# Patient Record
Sex: MALE | Race: WHITE | NOT HISPANIC OR LATINO | Employment: OTHER | URBAN - METROPOLITAN AREA
[De-identification: names, ages, dates, MRNs, and addresses within clinical notes are randomized per-mention and may not be internally consistent; named-entity substitution may affect disease eponyms.]

---

## 2023-12-13 ENCOUNTER — CLINICAL SUPPORT (OUTPATIENT)
Dept: AUDIOLOGY | Facility: HOSPITAL | Age: 83
End: 2023-12-13
Payer: MEDICARE

## 2023-12-13 DIAGNOSIS — H90.3 SENSORINEURAL HEARING LOSS (SNHL) OF BOTH EARS: Primary | ICD-10-CM

## 2023-12-13 PROCEDURE — V5299 HEARING SERVICE: HCPCS | Performed by: AUDIOLOGIST

## 2023-12-13 ASSESSMENT — ENCOUNTER SYMPTOMS
OCCASIONAL FEELINGS OF UNSTEADINESS: 0
DEPRESSION: 0
LOSS OF SENSATION IN FEET: 0

## 2023-12-13 ASSESSMENT — PAIN - FUNCTIONAL ASSESSMENT: PAIN_FUNCTIONAL_ASSESSMENT: 0-10

## 2023-12-13 ASSESSMENT — PAIN SCALES - GENERAL: PAINLEVEL_OUTOF10: 0 - NO PAIN

## 2023-12-13 NOTE — PROGRESS NOTES
AUDIOLOGY HEARING AID CHECK      Name:  Clifton Jane  :  1940  Age:  83 y.o.  Date of Appointment:  2023     History:  Mr. Jane is seen today for Hearing Aid Check (Reports that the right hearing aid does not seem to be working for him.  He has had arm surgery and struggles to clean the hearing aids and replace domes / filters himself.  )     Current hearing aids:  Dispensing date: 2023  Model: Phonak Audeo P50R  Right serial number: [id number] Left serial number: [id number]  Aids coupled via: -in-the-canal    size #1M Dome size medium power  Service, loss, and damage warranty expires on: 2026  Last hearing evaluation : 6-     Upon initial examination, the hearing aids sounded good.      Cleaned and checked both devices.    Replaced filters bilaterally.  Replaced domes bilaterally.     Both hearing aids sound and look good on final examination.      Otoscopic observation revealed mild cerumen in the ear canals bilaterally.    IMPRESSIONS:  Both hearing aids function appropriately following today's routine maintenance procedures.  The patient also reports the aids sound good.      RECOMMENDATIONS:  -Daily use of hearing aids.  -Daily maintenance to be performed at home.   -Follow-up hearing aid check in 3 months or as needed, already scheduled for 2024 at 10am    PATIENT EDUCATION:   Discussed above information with Mr. Jane.  Questions were addressed and the patient was encouraged to contact our department should concerns arise.    Time:  14:00 to 14:12    VICTOR M Starr, [facility]  Licensed Audiologist

## 2024-03-07 ENCOUNTER — CLINICAL SUPPORT (OUTPATIENT)
Dept: AUDIOLOGY | Facility: HOSPITAL | Age: 84
End: 2024-03-07
Payer: MEDICARE

## 2024-03-07 DIAGNOSIS — H90.3 SENSORINEURAL HEARING LOSS (SNHL) OF BOTH EARS: Primary | ICD-10-CM

## 2024-03-07 PROCEDURE — V5299 HEARING SERVICE: HCPCS | Performed by: AUDIOLOGIST

## 2024-03-07 ASSESSMENT — PAIN SCALES - GENERAL: PAINLEVEL_OUTOF10: 0 - NO PAIN

## 2024-03-07 ASSESSMENT — PAIN - FUNCTIONAL ASSESSMENT: PAIN_FUNCTIONAL_ASSESSMENT: 0-10

## 2024-03-07 NOTE — PROGRESS NOTES
AUDIOLOGY HEARING AID CHECK      Name:  Clifton Jane  :  1940  Age:  83 y.o.  Date of Appointment:  3/7/2024     History:  Mr. Jane is seen today for Hearing Aid Check (Routine hearing aid maintenance.  )   He has noticed a feedback when he raises the television volume high, even when he turns down the volume on the hearing aids.  He also struggles in noisy environments like a restaurant.    Current hearing aids:  Dispensing date: 2023  Model: Phonak Audeo P50R  Right serial number: [id number] Left serial number: [id number]  Aids coupled via: -in-the-[place]al    size #1M Dome size medium power  Service, loss, and damage warranty expires on: 2026  Last hearing evaluation : 6-     Upon initial examination, the hearing aids sounded good.      Cleaned and checked both devices.    Replaced filters bilaterally.  Gave extra supplies to patient.   Replaced domes bilaterally.     Both hearing aids sound and look good on final examination.  No feedback was noted in the office.      Otoscopic observation revealed mostly clear ear canals bilaterally.    Per data logging, this patient wears the hearing aids 15 hours / day on average, 82% in calm environments, 11% speech in noise, 7% comfort in noise.  Applied volume adaptations bilaterally, as he has been raising volume significantly since the last visit.    IMPRESSIONS:  Both hearing aids function appropriately following today's routine maintenance procedures.  The patient also reports the aids sound good.      RECOMMENDATIONS:  -Daily use of hearing aids.  -Daily maintenance to be performed at home.   -Follow-up hearing aid check in 6-12 months or as needed.  Scheduled on 2024 at 10am  -Communication strategies to ease listening difficulties were discussed.  For example,   1. Speakers should be in the same room and facing the listener.  2. Speakers should obtain the listener's attention before speaking.    3. Speakers  should speak slowly and clearly.  4. Reduce background noises during conversations when possible.     PATIENT EDUCATION:   Discussed above information with Mr. Jane.  Questions were addressed and the patient was encouraged to contact our department should concerns arise.    Time:  09:54 to 10:23      VICTOR M Starr, CCC-A  Senior Audiologist-

## 2024-09-05 ENCOUNTER — CLINICAL SUPPORT (OUTPATIENT)
Dept: AUDIOLOGY | Facility: HOSPITAL | Age: 84
End: 2024-09-05

## 2024-09-05 DIAGNOSIS — H90.3 SENSORINEURAL HEARING LOSS (SNHL) OF BOTH EARS: Primary | ICD-10-CM

## 2024-09-05 PROCEDURE — V5299 HEARING SERVICE: HCPCS | Mod: AUDSP | Performed by: AUDIOLOGIST

## 2024-09-05 NOTE — PROGRESS NOTES
"AUDIOLOGY HEARING AID CHECK      Name:  Clifton Jane  :  1940  Age:  84 y.o.  Date of Appointment:  2024     History:  Mr. Jane is seen today for Hearing Aid Check (Concern about right hearing aid )  The retention  had come off of the right  when he was cleaning and changing the filters, and he could not replace it as it was before.  I demonstrated how to replace the  in office today.    When watching television, he generally turns down the volume to be more comfortable.    He is bothered by loud, higher pitched sounds (e.g., children \"screeching\") in public    Current hearing aids:  Dispensing date: 2023  Model: Phonak Audeo P50R  Right serial number: [id number] Left serial number: [id number]  Aids coupled via: -[place]-the-[place]    size #1M Dome size medium power  Service, loss, and damage warranty expires on: 2026  Last hearing evaluation: 6-     Upon initial examination, the hearing aids sounded good.      Cleaned and checked both devices.    Replaced filters bilaterally.  Gave extra supplies to patient.   Replaced domes bilaterally.   Gave extra supplies to patient.     Both hearing aids sound and look good on final examination.      To address reported discomfort with loud, high frequency sounds, I increased noise block in the comfort in noise setting from 12 to 15.  Decreased high frequency gain for loud sounds bilaterally.      Otoscopic observation revealed mostly clear ear canals bilaterally.    Per data logging, this patient wears the hearing aids 10.2 hours / day on average.      IMPRESSIONS:  Both hearing aids function appropriately following today's routine maintenance procedures.  The patient also reports the aids sound good.      RECOMMENDATIONS:  -Daily use of hearing aids.  -Daily maintenance to be performed at home.   -Follow-up hearing aid check in 6-12  months or as needed.  Appointment is scheduled on 2025 at 10am.     PATIENT " EDUCATION:   Discussed above information with Mr. Jane.  Questions were addressed and the patient was encouraged to contact our department should concerns arise.    Time:  09:55 to 10:17      VICTOR M Starr, CCC-A  Senior Audiologist

## 2025-01-02 ENCOUNTER — CLINICAL SUPPORT (OUTPATIENT)
Dept: AUDIOLOGY | Facility: HOSPITAL | Age: 85
End: 2025-01-02

## 2025-01-02 DIAGNOSIS — H90.3 SENSORINEURAL HEARING LOSS (SNHL) OF BOTH EARS: Primary | ICD-10-CM

## 2025-01-02 PROCEDURE — V5299 HEARING SERVICE: HCPCS | Mod: AUDSP | Performed by: AUDIOLOGIST

## 2025-01-02 ASSESSMENT — PAIN - FUNCTIONAL ASSESSMENT: PAIN_FUNCTIONAL_ASSESSMENT: 0-10

## 2025-01-02 ASSESSMENT — PAIN SCALES - GENERAL: PAINLEVEL_OUTOF10: 0 - NO PAIN

## 2025-01-02 NOTE — PROGRESS NOTES
"AUDIOLOGY HEARING AID CHECK      Name:  Clifton Jane  :  1940  Age:  84 y.o.  Date of Appointment:  2025     History:  Mr. Jane is seen today for Hearing Aid Check (Not hearing well with hearing aids recently.  )    Current hearing aids:  Dispensing date: 2023  Model: Phonak Audeo P50R  Right serial number: [id number] Left serial number: [id number]  Aids coupled via: -[place]-the-[place]    size #1M (5) Dome size medium power  Service, loss, and damage warranty expires on: 2026  Last hearing evaluation: 6-     Upon initial examination, the right hearing aid was only static with poor volume control function.  The left hearing aid was weak.  Cerumen / debris was observed on both hearing aids, particularly in the microphone openings of the right hearing aid.      Cleaned and checked both devices.    Replaced receivers to #1M (5) due to his receivers and filters being discontinued.    Changed to cerustop filters bilaterally.  Gave extra supplies to patient and demonstrated use.    Replaced domes bilaterally.   Cleaned microphone openings and volume control bilaterally.      Both hearing aids sound and look good on final examination.  The \"down\" part of the right volume control was intermittent, but was working better with repeated movement of it.      Otoscopic observation revealed clear right ear canal and some cerumen deep in the left ear canal.    IMPRESSIONS:  Both hearing aids function appropriately following today's routine maintenance procedures.  The patient also reports the aids sound good.   We could consider right hearing aid repair if the volume control does not function at some point, though he can still adjust volume on both from the left device.       RECOMMENDATIONS:  -Daily use of hearing aids.  -Daily maintenance to be performed at home.   -Follow-up hearing aid check in 6-12 months or as needed.  He will schedule when he returns from [place] in the " spring.    PATIENT EDUCATION:   Discussed above information with Mr. Jane.  Questions were addressed and the patient was encouraged to contact our department should concerns arise.    Time:  11:32 to 11:57      VICTOR M Starr, CCC-A  Senior Audiologist

## 2025-04-02 ENCOUNTER — APPOINTMENT (OUTPATIENT)
Dept: AUDIOLOGY | Facility: HOSPITAL | Age: 85
End: 2025-04-02
Payer: MEDICARE

## 2025-04-14 ENCOUNTER — CLINICAL SUPPORT (OUTPATIENT)
Dept: AUDIOLOGY | Facility: HOSPITAL | Age: 85
End: 2025-04-14

## 2025-04-14 DIAGNOSIS — H90.3 SENSORINEURAL HEARING LOSS (SNHL) OF BOTH EARS: Primary | ICD-10-CM

## 2025-04-14 PROCEDURE — V5299 HEARING SERVICE: HCPCS | Mod: AUDSP | Performed by: AUDIOLOGIST

## 2025-04-14 ASSESSMENT — PAIN SCALES - GENERAL: PAINLEVEL_OUTOF10: 0 - NO PAIN

## 2025-04-14 ASSESSMENT — PAIN - FUNCTIONAL ASSESSMENT: PAIN_FUNCTIONAL_ASSESSMENT: 0-10

## 2025-04-14 NOTE — PROGRESS NOTES
AUDIOLOGY HEARING AID CHECK      Name:  Clifton Jane  :  1940  Age:  85 y.o.  Date of Appointment:  2025     History:  Mr. Jane is seen today for Hearing Aid Check  About 3 weeks, gain / volume seems to be reducing.  The charge indicator lights seem to be working.        Current hearing aids:  Dispensing date: 2023  Model: Phonak Audeo P50R  Right serial number: [id number] Left serial number: [id number] (corrected 2025)  Aids coupled via: -[place]the[place]    size #1M (5) Dome size medium power  Service, loss, and damage warranty expires on: 2026  Last hearing evaluation: 6-     Upon initial examination, the hearing aid batteries were very low (red indicator lights).  After brief charging in office, both hearing aids sounded very weak, with cerumen noted on both receivers / domes.      Cleaned and checked both devices.    Replaced filters bilaterally.  Replaced domes bilaterally.     The right hearing aid still seemed very weak.  Changed the .      Both hearing aids sound and look good on final examination.      Otoscopic observation revealed mild, nonoccluding cerumen in the ear canals bilaterally.    Per data logging, this patient wears the hearing aids 13-14 hours / day on average.      IMPRESSIONS:  Both hearing aids function appropriately following today's routine maintenance procedures.  The patient also reports the aids sound good.      RECOMMENDATIONS:  -Daily use of hearing aids.  -Daily maintenance to be performed at home.   -Follow-up hearing aid check in 3 months or as needed.  Appointment is scheduled on 2025 at 10am.     PATIENT EDUCATION:   Discussed above information with Mr. Jane.  Questions were addressed and the patient was encouraged to contact our department should concerns arise.    Verified patient's identification verbally and by armband.    Time:  10:34 to 11:01      VICTOR M Starr, CCC-A  Senior Audiologist

## 2025-07-17 ENCOUNTER — APPOINTMENT (OUTPATIENT)
Dept: AUDIOLOGY | Facility: HOSPITAL | Age: 85
End: 2025-07-17
Payer: MEDICARE

## 2025-07-17 DIAGNOSIS — H90.3 SENSORINEURAL HEARING LOSS (SNHL) OF BOTH EARS: Primary | ICD-10-CM

## 2025-07-17 PROCEDURE — V5299 HEARING SERVICE: HCPCS | Mod: AUDSP | Performed by: AUDIOLOGIST

## 2025-07-17 ASSESSMENT — PAIN - FUNCTIONAL ASSESSMENT: PAIN_FUNCTIONAL_ASSESSMENT: 0-10

## 2025-07-17 ASSESSMENT — PAIN SCALES - GENERAL: PAINLEVEL_OUTOF10: 0 - NO PAIN

## 2025-07-17 NOTE — PROGRESS NOTES
"AUDIOLOGY HEARING AID CHECK      Name:  Clifton Jane  :  1940  Age:  85 y.o.  Date of Appointment:  2025     History:  Clifton Jane is seen today for Hearing Aid Check   Patient reports not hearing as well as \"I usually hear.\"    Current hearing aids:  Dispensing date: 2023  Model: Phonak Audeo P50R  Right serial number: [id number]      Left serial number: [id number] (corrected 2025)  Aids coupled via: -[place]the[place]    size #1M (5) with cerustops    Dome size medium power  Service, loss, and damage warranty expires on: 2026  Last hearing evaluation: 6-     Upon initial examination, the hearing aids were dead, with cerumen occluding filters bilaterally.      Cleaned and checked both devices.    Replaced filters bilaterally.    Replaced domes bilaterally.     No change for left hearing aid.  Replaced left  with improvement noted.    Both hearing aids sound and look good on final examination.      Otoscopic observation revealed mostly clear ear canals bilaterally.    IMPRESSIONS:  Both hearing aids function appropriately following today's routine maintenance procedures.  The patient also reports the aids sound good.      RECOMMENDATIONS:  -Daily use of hearing aids.  -Daily maintenance to be performed at home.   -Follow-up hearing aid check in 3 months or as needed.  Appointment is scheduled on 2025 at 10am.     PATIENT EDUCATION:   Discussed above information with Clifton Jane.  Questions were addressed and the patient was encouraged to contact our department should concerns arise.    Verified patient's identification verbally and by armband.    Time:  10:03 - 10:25      VICTOR M Starr, CCC-A  Senior Audiologist  "